# Patient Record
Sex: MALE | Race: BLACK OR AFRICAN AMERICAN | NOT HISPANIC OR LATINO | Employment: OTHER | URBAN - METROPOLITAN AREA
[De-identification: names, ages, dates, MRNs, and addresses within clinical notes are randomized per-mention and may not be internally consistent; named-entity substitution may affect disease eponyms.]

---

## 2018-12-06 ENCOUNTER — TELEPHONE (OUTPATIENT)
Dept: OPHTHALMOLOGY | Facility: CLINIC | Age: 78
End: 2018-12-06

## 2018-12-06 NOTE — TELEPHONE ENCOUNTER
Spoke with Radha inform pt we take [facility] for Cataract Eval. She ask if our surgery center take insurance as well to perform Cataract Sx. Advise Radha to call Yadi Abad, our .

## 2020-03-07 ENCOUNTER — HOSPITAL ENCOUNTER (EMERGENCY)
Facility: HOSPITAL | Age: 80
Discharge: HOME | End: 2020-03-07
Attending: EMERGENCY MEDICINE
Payer: MEDICARE

## 2020-03-07 VITALS
TEMPERATURE: 98 F | SYSTOLIC BLOOD PRESSURE: 140 MMHG | WEIGHT: 220 LBS | BODY MASS INDEX: 31.5 KG/M2 | HEIGHT: 70 IN | HEART RATE: 72 BPM | RESPIRATION RATE: 17 BRPM | DIASTOLIC BLOOD PRESSURE: 76 MMHG | OXYGEN SATURATION: 98 %

## 2020-03-07 DIAGNOSIS — R41.82 ALTERED MENTAL STATUS: ICD-10-CM

## 2020-03-07 DIAGNOSIS — F01.50 VASCULAR DEMENTIA WITHOUT BEHAVIORAL DISTURBANCE: Primary | ICD-10-CM

## 2020-03-07 LAB
ALBUMIN SERPL BCP-MCNC: 3.5 G/DL (ref 3.5–5.2)
ALP SERPL-CCNC: 147 U/L (ref 55–135)
ALT SERPL W/O P-5'-P-CCNC: 7 U/L (ref 10–44)
AMPHET+METHAMPHET UR QL: NEGATIVE
ANION GAP SERPL CALC-SCNC: 13 MMOL/L (ref 8–16)
AST SERPL-CCNC: 14 U/L (ref 10–40)
BARBITURATES UR QL SCN>200 NG/ML: NEGATIVE
BASOPHILS # BLD AUTO: 0.01 K/UL (ref 0–0.2)
BASOPHILS NFR BLD: 0.2 % (ref 0–1.9)
BENZODIAZ UR QL SCN>200 NG/ML: NEGATIVE
BILIRUB SERPL-MCNC: 0.4 MG/DL (ref 0.1–1)
BILIRUB UR QL STRIP: NEGATIVE
BUN SERPL-MCNC: 16 MG/DL (ref 8–23)
BZE UR QL SCN: NEGATIVE
CALCIUM SERPL-MCNC: 9.6 MG/DL (ref 8.7–10.5)
CANNABINOIDS UR QL SCN: NEGATIVE
CHLORIDE SERPL-SCNC: 105 MMOL/L (ref 95–110)
CLARITY UR: CLEAR
CO2 SERPL-SCNC: 21 MMOL/L (ref 23–29)
COLOR UR: YELLOW
CREAT SERPL-MCNC: 1.1 MG/DL (ref 0.5–1.4)
CREAT UR-MCNC: 68.4 MG/DL (ref 23–375)
DIFFERENTIAL METHOD: NORMAL
EOSINOPHIL # BLD AUTO: 0 K/UL (ref 0–0.5)
EOSINOPHIL NFR BLD: 0.6 % (ref 0–8)
ERYTHROCYTE [DISTWIDTH] IN BLOOD BY AUTOMATED COUNT: 12.7 % (ref 11.5–14.5)
EST. GFR  (AFRICAN AMERICAN): >60 ML/MIN/1.73 M^2
EST. GFR  (NON AFRICAN AMERICAN): >60 ML/MIN/1.73 M^2
ETHANOL SERPL-MCNC: <10 MG/DL
GLUCOSE SERPL-MCNC: 155 MG/DL (ref 70–110)
GLUCOSE UR QL STRIP: ABNORMAL
HCT VFR BLD AUTO: 41.4 % (ref 40–54)
HGB BLD-MCNC: 14.1 G/DL (ref 14–18)
HGB UR QL STRIP: NEGATIVE
IMM GRANULOCYTES # BLD AUTO: 0.01 K/UL (ref 0–0.04)
IMM GRANULOCYTES NFR BLD AUTO: 0.2 % (ref 0–0.5)
INR PPP: 1.1 (ref 0.8–1.2)
KETONES UR QL STRIP: NEGATIVE
LEUKOCYTE ESTERASE UR QL STRIP: NEGATIVE
LYMPHOCYTES # BLD AUTO: 2.5 K/UL (ref 1–4.8)
LYMPHOCYTES NFR BLD: 37.7 % (ref 18–48)
MAGNESIUM SERPL-MCNC: 1.5 MG/DL (ref 1.6–2.6)
MCH RBC QN AUTO: 30.3 PG (ref 27–31)
MCHC RBC AUTO-ENTMCNC: 34.1 G/DL (ref 32–36)
MCV RBC AUTO: 89 FL (ref 82–98)
METHADONE UR QL SCN>300 NG/ML: NEGATIVE
MONOCYTES # BLD AUTO: 0.4 K/UL (ref 0.3–1)
MONOCYTES NFR BLD: 6.5 % (ref 4–15)
NEUTROPHILS # BLD AUTO: 3.6 K/UL (ref 1.8–7.7)
NEUTROPHILS NFR BLD: 54.8 % (ref 38–73)
NITRITE UR QL STRIP: NEGATIVE
NRBC BLD-RTO: 0 /100 WBC
OPIATES UR QL SCN: NEGATIVE
PCP UR QL SCN>25 NG/ML: NEGATIVE
PH UR STRIP: 5 [PH] (ref 5–8)
PLATELET # BLD AUTO: 220 K/UL (ref 150–350)
PMV BLD AUTO: 10.5 FL (ref 9.2–12.9)
POCT GLUCOSE: 156 MG/DL (ref 70–110)
POTASSIUM SERPL-SCNC: 4.4 MMOL/L (ref 3.5–5.1)
PROT SERPL-MCNC: 6.9 G/DL (ref 6–8.4)
PROT UR QL STRIP: NEGATIVE
PROTHROMBIN TIME: 11.8 SEC (ref 9–12.5)
RBC # BLD AUTO: 4.66 M/UL (ref 4.6–6.2)
SODIUM SERPL-SCNC: 139 MMOL/L (ref 136–145)
SP GR UR STRIP: 1.01 (ref 1–1.03)
TOXICOLOGY INFORMATION: NORMAL
URN SPEC COLLECT METH UR: ABNORMAL
UROBILINOGEN UR STRIP-ACNC: NEGATIVE EU/DL
WBC # BLD AUTO: 6.63 K/UL (ref 3.9–12.7)

## 2020-03-07 PROCEDURE — 81003 URINALYSIS AUTO W/O SCOPE: CPT | Mod: 59

## 2020-03-07 PROCEDURE — 80307 DRUG TEST PRSMV CHEM ANLYZR: CPT

## 2020-03-07 PROCEDURE — 80053 COMPREHEN METABOLIC PANEL: CPT

## 2020-03-07 PROCEDURE — 85025 COMPLETE CBC W/AUTO DIFF WBC: CPT

## 2020-03-07 PROCEDURE — 82962 GLUCOSE BLOOD TEST: CPT

## 2020-03-07 PROCEDURE — 85610 PROTHROMBIN TIME: CPT

## 2020-03-07 PROCEDURE — 93005 ELECTROCARDIOGRAM TRACING: CPT

## 2020-03-07 PROCEDURE — 93010 ELECTROCARDIOGRAM REPORT: CPT | Mod: ,,, | Performed by: INTERNAL MEDICINE

## 2020-03-07 PROCEDURE — 99285 EMERGENCY DEPT VISIT HI MDM: CPT | Mod: 25

## 2020-03-07 PROCEDURE — 93010 EKG 12-LEAD: ICD-10-PCS | Mod: ,,, | Performed by: INTERNAL MEDICINE

## 2020-03-07 PROCEDURE — 93005 ELECTROCARDIOGRAM TRACING: CPT | Performed by: INTERNAL MEDICINE

## 2020-03-07 PROCEDURE — 80320 DRUG SCREEN QUANTALCOHOLS: CPT

## 2020-03-07 PROCEDURE — 83735 ASSAY OF MAGNESIUM: CPT

## 2020-03-07 NOTE — ED TRIAGE NOTES
"Pt here w/ sister for AMS. Pt oriented to self at this time. Sister states pt lives in [place] and took a bus here "to [facility] me at the [facility] because we were having a prayer breakfast". Pt was walking in the wrong direction of the [employer] when family members that live in [place] spotted him and stopped to pick him up. After dropping pt off to sister at [facility], she states pt appeared extremely confused, dazed and "could hardly stand up". Sister got water and attempted to have pt eat something "because hes a diabetic but he couldn't even hold anything so I called EMS". Per pt's sister at bedside, pt has had "a few of these episodes where he has gotten lost".  Pt lives with daughter in [place]; she is not here at this time but is on her way. Will collect more pt history and information when she arrives.   "

## 2020-03-07 NOTE — ED PROVIDER NOTES
Encounter Date: 3/7/2020    SCRIBE #1 NOTE: I, Pamela Hollingsworth, am scribing for, and in the presence of,  Nelson Lozano MD. I have scribed the following portions of the note - Other sections scribed: HPI, ROS.       History     Chief Complaint   Patient presents with    Altered Mental Status     EMS reports pt with altered mental status. sister at [facility] states she think he may have some early dementia, denies current hx. states he has been forgetting alot lately. pt only oriented to self at this time.      79 year old male patient presents to the ED with his sister, who reports confusion and disorientation onset today. The patient reports that he left his home this morning to go to [employer] and got lost on the way there. His sister reports that when she went to pick him up he did not know where he was or who he was and had difficulty walking. His sister reports that this is the first incidence of these symptoms that she has seen, but states that she has not yet been able to contact the patient's daughter, who he currently lives with, for more information. The patient denies any fever, chills, diaphoresis, headache, eye problems, otalgia, sore throat, chest pain, shortness of breath, cough, abdominal pain, vomiting, diarrhea, dysuria, arm/leg problems, or rash. He reports a past medical history of Diabetes Mellitus, Hypertension, and Hyperlipidemia. He reports a past surgical history of cholecystectomy, pacemaker placement, and cataract surgery. He reports no known drug allergies. He denies tobacco, alcohol, or drug use.    The history is provided by the patient and a relative.     Review of patient's allergies indicates:  No Known Allergies  Past Medical History:   Diagnosis Date    CHF (congestive heart failure)     Diabetes mellitus     Hypertension      Past Surgical History:   Procedure Laterality Date    CARDIAC PACEMAKER PLACEMENT       No family history on file.  Social History     Tobacco Use    Smoking  status: Never Smoker   Substance Use Topics    Alcohol use: No    Drug use: No     Review of Systems   Constitutional: Negative for chills, diaphoresis and fever.   HENT: Negative for ear pain and sore throat.    Eyes: Negative for visual disturbance.   Respiratory: Negative for cough and shortness of breath.    Cardiovascular: Negative for chest pain.   Gastrointestinal: Negative for abdominal pain, diarrhea and vomiting.   Genitourinary: Negative for dysuria.   Musculoskeletal: Positive for gait problem (Difficulty walking). Negative for arthralgias and myalgias.        (-) Arm/leg problems   Skin: Negative for rash.   Neurological: Negative for headaches.   Psychiatric/Behavioral: Positive for confusion.       Physical Exam     Initial Vitals [03/07/20 1314]   BP Pulse Resp Temp SpO2   (!) 136/96 88 16 97.9 °F (36.6 °C) 98 %      MAP       --         Physical Exam  The patient was examined specifically for the following:   General:No significant distress, Good color, Warm and dry. Head and neck:Scalp atraumatic, Neck supple. Neurological:Appropriate conversation, Gross motor deficits. Eyes:Conjugate gaze, Clear corneas. ENT: No epistaxis. Cardiac: Regular rate and rhythm, Grossly normal heart tones. Pulmonary: Wheezing, Rales. Gastrointestinal: Abdominal tenderness, Abdominal distention. Musculoskeletal: Extremity deformity, Apparent pain with range of motion of the joints. Skin: Rash.   The findings on examination were normal except for the following:  Patient is obviously confused.  Cranial nerves, motor and sensory examination grossly unremarkable.  The patient walks without difficulty.  There is no disequilibrium.  Vital signs stable. The neck is supple the patient is not warm to touch.  The patient is oriented to [facility], [facility].   ED Course   Procedures  Labs Reviewed   COMPREHENSIVE METABOLIC PANEL - Abnormal; Notable for the following components:       Result Value    CO2 21 (*)      Glucose 155 (*)     Alkaline Phosphatase 147 (*)     ALT 7 (*)     All other components within normal limits   URINALYSIS, REFLEX TO URINE CULTURE - Abnormal; Notable for the following components:    Glucose, UA 1+ (*)     All other components within normal limits    Narrative:     Preferred Collection Type->Urine, Clean Catch   MAGNESIUM - Abnormal; Notable for the following components:    Magnesium 1.5 (*)     All other components within normal limits   POCT GLUCOSE - Abnormal; Notable for the following components:    POCT Glucose 156 (*)     All other components within normal limits   PROTIME-INR   CBC W/ AUTO DIFFERENTIAL   ALCOHOL,MEDICAL (ETHANOL)   DRUG SCREEN PANEL, URINE EMERGENCY    Narrative:     Preferred Collection Type->Urine, Clean Catch     EKG Readings: (Independently Interpreted)   This patient is in a paced rhythm with a heart rate of 77.  The pacing is ventricular.  There are occasional PVCs.  There are no significant ST segment or T-wave changes.       Imaging Results          X-Ray Chest AP Portable (Final result)  Result time 03/07/20 14:39:11    Final result by Malick Melton MD (03/07/20 14:39:11)                 Impression:      1. No acute cardiopulmonary process.      Electronically signed by: Malick Melton MD  Date:    03/07/2020  Time:    14:39             Narrative:    EXAMINATION:  XR CHEST AP PORTABLE    CLINICAL HISTORY:  chest pain;    TECHNIQUE:  Single frontal view of the chest was performed.    COMPARISON:  03/13/2015    FINDINGS:  Left chest wall pacer noted.  The cardiomediastinal silhouette is prominent, similar to the previous examination noting some magnification by technique..  There is no pleural effusion.  The trachea is midline.  The lungs are symmetrically expanded bilaterally without evidence of acute parenchymal process.  There is left basilar subsegmental atelectasis or scarring.  No large focal consolidation seen.  There is no pneumothorax.  The osseous  structures are remarkable for degenerative changes..                               CT Head Without Contrast (Final result)  Result time 03/07/20 14:14:05    Final result by Marcin Roth MD (03/07/20 14:14:05)                 Impression:      No change since the CT from 03/13/2015.  No acute abnormality.  Chronic findings as above      Electronically signed by: Marcin Roth MD  Date:    03/07/2020  Time:    14:14             Narrative:    EXAMINATION:  CT HEAD WITHOUT CONTRAST    CLINICAL HISTORY:  Confusion/delirium, altered LOC, unexplained; Altered mental status, unspecified    TECHNIQUE:  Low dose axial images were obtained through the head.  Coronal and sagittal reformations were also performed. Contrast was not administered.    COMPARISON:  03/13/2015.    FINDINGS:  Calcification of the falx.  Ventricles and sulci are prominent consistent with atrophy.  Chronic microvascular ischemic changes.  No acute intracranial hemorrhage or extra-axial collection.  No mass effect or shift of the midline structures.  No CT evidence to suggest an acute cerebrovascular accident.  The ventricles and the basilar cisterns remain patent.  Calcification of the carotid siphons.    Orbits are symmetric in appearance.  Mastoid air cells are normally pneumatized.  Right maxillary antrectomy changes.  Skull appears to be intact.  Motion artifact limits detail.                              Medical decision making:  Given the above this patient had an episode of confusion where he when out in his car [employer] and got lost.  This happened once before several years ago when the patient had a low blood sugar.  The patient has no other significant complaints.  There is no history of headache. The family reports that this is a new phenomenon for him today.  No other neurologic deficits are noted on the physical exam.  The patient is fully oriented at this moment.  I consulted , neurology, who feels that this patient likely has  dementia.  He feels that the patient can be worked up as an outpatient.  I will refer to Dr. Duffy.  The patient looks comfortable relaxed and cheerful at this time.  I will discharge him.  I specifically doubt stroke meningitis infection hypoglycemia and drug intoxication.  CT scan does reveal a fair amount of brain volume loss.                Scribe Attestation:   Scribe #1: I performed the above scribed service and the documentation accurately describes the services I performed. I attest to the accuracy of the note.                          Clinical Impression:       ICD-10-CM ICD-9-CM   1. Vascular dementia without behavioral disturbance F01.50 290.40   2. Altered mental status R41.82 780.97             ED Disposition Condition    Discharge Stable        ED Prescriptions     None        Follow-up Information     Follow up With Specialties Details Why Contact Info    Pancho Osuna MD Neurology In 3 days  [address]  [address] [place] [id number]  [phone]                          I personally performed the services described in this documentation.  All medical record  entries made by the scribe are at my direction and in my presence.  Signed, Dr. Marta Lozano MD  03/07/20 1600

## 2020-03-07 NOTE — DISCHARGE INSTRUCTIONS
Please do not let him drive alone.  Please have him return if he gets worse or if new problems develop.  Usual medicines.  Please follow-up with the neurologist above.  Rest.  Please call Dr. Grimes to reported a INR of 1.1 and a PT of 11.8, on Monday morning.